# Patient Record
Sex: FEMALE | Race: WHITE | NOT HISPANIC OR LATINO | Employment: UNEMPLOYED | URBAN - METROPOLITAN AREA
[De-identification: names, ages, dates, MRNs, and addresses within clinical notes are randomized per-mention and may not be internally consistent; named-entity substitution may affect disease eponyms.]

---

## 2021-09-30 PROBLEM — F29 PSYCHOSIS: Status: ACTIVE | Noted: 2021-09-30

## 2021-10-01 PROBLEM — F06.1 CATATONIA ASSOCIATED WITH ANOTHER MENTAL DISORDER: Status: ACTIVE | Noted: 2021-10-01

## 2021-10-01 PROBLEM — F31.64 BIPOLAR DISORDER, CURR EPISODE MIXED, SEVERE, WITH PSYCHOTIC FEATURES: Status: ACTIVE | Noted: 2021-10-01

## 2021-10-01 PROBLEM — Z86.59 HISTORY OF POSTTRAUMATIC STRESS DISORDER (PTSD): Status: ACTIVE | Noted: 2021-10-01

## 2022-09-01 PROBLEM — F17.200 TOBACCO USE DISORDER: Status: ACTIVE | Noted: 2022-09-01

## 2022-09-01 PROBLEM — F31.64 BIPOLAR I DISORDER, MOST RECENT EPISODE MIXED, SEVERE WITH PSYCHOTIC FEATURES: Status: ACTIVE | Noted: 2022-09-01

## 2022-09-02 PROBLEM — I95.9 HYPOTENSION: Status: ACTIVE | Noted: 2022-09-02

## 2022-09-02 PROBLEM — N39.0 URINARY TRACT INFECTION WITHOUT HEMATURIA: Status: ACTIVE | Noted: 2022-09-02

## 2022-09-02 PROBLEM — E44.0 MODERATE PROTEIN-CALORIE MALNUTRITION: Status: ACTIVE | Noted: 2022-09-02

## 2022-09-11 PROBLEM — R60.0 BILATERAL LOWER EXTREMITY EDEMA: Status: ACTIVE | Noted: 2022-09-11

## 2022-09-18 PROBLEM — F31.64 BIPOLAR I DISORDER, MOST RECENT EPISODE MIXED, SEVERE WITH PSYCHOTIC FEATURES: Status: RESOLVED | Noted: 2022-09-01 | Resolved: 2022-09-18

## 2022-09-18 PROBLEM — R60.0 BILATERAL LOWER EXTREMITY EDEMA: Status: RESOLVED | Noted: 2022-09-11 | Resolved: 2022-09-18

## 2022-09-18 PROBLEM — N39.0 URINARY TRACT INFECTION WITHOUT HEMATURIA: Status: RESOLVED | Noted: 2022-09-02 | Resolved: 2022-09-18

## 2022-09-18 PROBLEM — I95.9 HYPOTENSION: Status: RESOLVED | Noted: 2022-09-02 | Resolved: 2022-09-18

## 2022-09-18 PROBLEM — F06.1 CATATONIA ASSOCIATED WITH ANOTHER MENTAL DISORDER: Status: RESOLVED | Noted: 2021-10-01 | Resolved: 2022-09-18

## 2022-09-21 ENCOUNTER — PATIENT OUTREACH (OUTPATIENT)
Dept: ADMINISTRATIVE | Facility: CLINIC | Age: 37
End: 2022-09-21

## 2022-09-21 NOTE — PROGRESS NOTES
C3 nurse spoke with Terrie Saab's stepfather, Sharad Borjas, for a T[facility] post hospital discharge follow up call.  IN CM NOTE patient has appointment on 9/28/2022 @ 1100 with [facility], and 10/4/2022 @ 1015 with [facility]. Providers not within [facility].